# Patient Record
Sex: FEMALE | Race: WHITE | NOT HISPANIC OR LATINO | URBAN - METROPOLITAN AREA
[De-identification: names, ages, dates, MRNs, and addresses within clinical notes are randomized per-mention and may not be internally consistent; named-entity substitution may affect disease eponyms.]

---

## 2017-09-04 ENCOUNTER — EMERGENCY (EMERGENCY)
Facility: HOSPITAL | Age: 32
LOS: 0 days | Discharge: HOME | End: 2017-09-04

## 2017-09-04 DIAGNOSIS — R10.12 LEFT UPPER QUADRANT PAIN: ICD-10-CM

## 2017-09-04 DIAGNOSIS — R10.33 PERIUMBILICAL PAIN: ICD-10-CM

## 2021-06-14 ENCOUNTER — OUTPATIENT (OUTPATIENT)
Dept: OUTPATIENT SERVICES | Facility: HOSPITAL | Age: 36
LOS: 1 days | Discharge: HOME | End: 2021-06-14

## 2021-06-14 DIAGNOSIS — N97.9 FEMALE INFERTILITY, UNSPECIFIED: ICD-10-CM

## 2021-07-06 ENCOUNTER — EMERGENCY (EMERGENCY)
Facility: HOSPITAL | Age: 36
LOS: 0 days | Discharge: HOME | End: 2021-07-06
Attending: EMERGENCY MEDICINE | Admitting: EMERGENCY MEDICINE
Payer: COMMERCIAL

## 2021-07-06 VITALS
RESPIRATION RATE: 18 BRPM | DIASTOLIC BLOOD PRESSURE: 91 MMHG | SYSTOLIC BLOOD PRESSURE: 143 MMHG | WEIGHT: 179.9 LBS | OXYGEN SATURATION: 99 % | HEART RATE: 84 BPM | HEIGHT: 64 IN | TEMPERATURE: 97 F

## 2021-07-06 DIAGNOSIS — R30.0 DYSURIA: ICD-10-CM

## 2021-07-06 DIAGNOSIS — N39.0 URINARY TRACT INFECTION, SITE NOT SPECIFIED: ICD-10-CM

## 2021-07-06 LAB
APPEARANCE UR: CLEAR — SIGNIFICANT CHANGE UP
BACTERIA # UR AUTO: ABNORMAL
BILIRUB UR-MCNC: NEGATIVE — SIGNIFICANT CHANGE UP
COLOR SPEC: SIGNIFICANT CHANGE UP
DIFF PNL FLD: NEGATIVE — SIGNIFICANT CHANGE UP
EPI CELLS # UR: 0 /HPF — SIGNIFICANT CHANGE UP (ref 0–5)
GLUCOSE UR QL: NEGATIVE — SIGNIFICANT CHANGE UP
HYALINE CASTS # UR AUTO: 1 /LPF — SIGNIFICANT CHANGE UP (ref 0–7)
KETONES UR-MCNC: NEGATIVE — SIGNIFICANT CHANGE UP
LEUKOCYTE ESTERASE UR-ACNC: ABNORMAL
NITRITE UR-MCNC: NEGATIVE — SIGNIFICANT CHANGE UP
PH UR: 7.5 — SIGNIFICANT CHANGE UP (ref 5–8)
PROT UR-MCNC: SIGNIFICANT CHANGE UP
RBC CASTS # UR COMP ASSIST: 11 /HPF — HIGH (ref 0–4)
SP GR SPEC: 1.01 — SIGNIFICANT CHANGE UP (ref 1.01–1.03)
UROBILINOGEN FLD QL: SIGNIFICANT CHANGE UP
WBC UR QL: 35 /HPF — HIGH (ref 0–5)

## 2021-07-06 PROCEDURE — 99283 EMERGENCY DEPT VISIT LOW MDM: CPT

## 2021-07-06 RX ORDER — NITROFURANTOIN MACROCRYSTAL 50 MG
100 CAPSULE ORAL ONCE
Refills: 0 | Status: COMPLETED | OUTPATIENT
Start: 2021-07-06 | End: 2021-07-06

## 2021-07-06 RX ORDER — NITROFURANTOIN MACROCRYSTAL 50 MG
1 CAPSULE ORAL
Qty: 14 | Refills: 0
Start: 2021-07-06 | End: 2021-07-12

## 2021-07-06 RX ADMIN — Medication 100 MILLIGRAM(S): at 03:20

## 2021-07-06 NOTE — ED PROVIDER NOTE - PATIENT PORTAL LINK FT
You can access the FollowMyHealth Patient Portal offered by [facility] by registering at the following website: [email or web address]. By joining [facility] [employer] [facility]’s FollowMyHealth portal, you will also be able to view your health information using other applications (apps) compatible with our system.

## 2021-07-06 NOTE — ED PROVIDER NOTE - NS ED ROS FT
Constitutional: No fever, chills, unintended weight loss.  Eyes:  No visual changes, eye pain or discharge.  ENMT:  No hearing changes, pain, no sore throat or runny nose, no difficulty swallowing  Cardiac:  No chest pain, SOB or edema. No chest pain with exertion.  Respiratory:  No cough or respiratory distress. No hemoptysis. No history of asthma or RAD.  GI:  No nausea, vomiting, diarrhea or abdominal pain.  :  see hpi  MS:  No myalgia, muscle weakness, joint pain or back pain.  Neuro:  No headache or weakness.  No LOC.  Skin:  No skin rash.   Endocrine: No history of thyroid disease or diabetes.

## 2021-07-06 NOTE — ED PROVIDER NOTE - CARE PROVIDER_API CALL
ABHI LIU  [id number]  [address] [place] [address]  Phone: [phone]  Fax: [phone]  Established Patient  Follow Up Time: 7-10 Days

## 2021-07-06 NOTE — ED PROVIDER NOTE - OBJECTIVE STATEMENT
36F p/w dysuria x 2d. S/p uti ~1 month prior, finished course of abx unsure of name. No f/c, uri sx, cp/sob, nvd, abd pain, flank pain, abnorm vag discharge. Denies h/o STIs.

## 2021-07-06 NOTE — ED PROVIDER NOTE - PHYSICAL EXAMINATION
PE:  nad  skin warm, dry  ncat  neck supple  tachy 100s reg rhythm nl s1s2 no mrg  ctab no wrr  abd soft nd mild SP ttp rest non-tender no palpable masses no rgr  back non-tender no cvat  ext no cce dpi  neuro aaox3 grossly nf exam

## 2021-07-07 LAB
CULTURE RESULTS: SIGNIFICANT CHANGE UP
SPECIMEN SOURCE: SIGNIFICANT CHANGE UP

## 2021-08-04 NOTE — ED ADULT TRIAGE NOTE - SOURCE OF INFORMATION
Discharge and education instructions reviewed. Patient verbalized understanding, teach-back successful. Patient denied questions at this time. No acute distress noted. Patient instructed to follow-up as noted - return to emergency department if symptoms worsen. Patient verbalized understanding. Discharged per EDMD with discharge instructions.         [facility]  08/04/21 0291 Patient

## 2021-08-30 ENCOUNTER — OUTPATIENT (OUTPATIENT)
Dept: OUTPATIENT SERVICES | Facility: HOSPITAL | Age: 36
LOS: 1 days | Discharge: HOME | End: 2021-08-30

## 2021-08-30 DIAGNOSIS — N97.9 FEMALE INFERTILITY, UNSPECIFIED: ICD-10-CM

## 2021-10-05 NOTE — ED PROVIDER NOTE - NSDCPRINTRESULTS_ED_ALL_ED
Patient requests all Lab, Cardiology, and Radiology Results on their Discharge Instructions Private car

## 2024-09-16 ENCOUNTER — EMERGENCY (EMERGENCY)
Facility: HOSPITAL | Age: 39
LOS: 0 days | Discharge: HOME | End: 2024-09-16
Attending: STUDENT IN AN ORGANIZED HEALTH CARE EDUCATION/TRAINING PROGRAM
Payer: COMMERCIAL

## 2024-09-16 VITALS
HEIGHT: 64 IN | TEMPERATURE: 98 F | HEART RATE: 97 BPM | RESPIRATION RATE: 18 BRPM | OXYGEN SATURATION: 100 % | SYSTOLIC BLOOD PRESSURE: 144 MMHG | DIASTOLIC BLOOD PRESSURE: 97 MMHG | WEIGHT: 179.9 LBS

## 2024-09-16 DIAGNOSIS — R10.9 UNSPECIFIED ABDOMINAL PAIN: ICD-10-CM

## 2024-09-16 DIAGNOSIS — K21.9 GASTRO-ESOPHAGEAL REFLUX DISEASE WITHOUT ESOPHAGITIS: ICD-10-CM

## 2024-09-16 DIAGNOSIS — K80.20 CALCULUS OF GALLBLADDER WITHOUT CHOLECYSTITIS WITHOUT OBSTRUCTION: ICD-10-CM

## 2024-09-16 LAB
ALBUMIN SERPL ELPH-MCNC: 4.9 G/DL — SIGNIFICANT CHANGE UP (ref 3.5–5.2)
ALP SERPL-CCNC: 49 U/L — SIGNIFICANT CHANGE UP (ref 30–115)
ALT FLD-CCNC: 18 U/L — SIGNIFICANT CHANGE UP (ref 0–41)
ANION GAP SERPL CALC-SCNC: 11 MMOL/L — SIGNIFICANT CHANGE UP (ref 7–14)
AST SERPL-CCNC: 23 U/L — SIGNIFICANT CHANGE UP (ref 0–41)
BASOPHILS # BLD AUTO: 0.06 K/UL — SIGNIFICANT CHANGE UP (ref 0–0.2)
BASOPHILS NFR BLD AUTO: 0.7 % — SIGNIFICANT CHANGE UP (ref 0–1)
BILIRUB SERPL-MCNC: 0.3 MG/DL — SIGNIFICANT CHANGE UP (ref 0.2–1.2)
BUN SERPL-MCNC: 16 MG/DL — SIGNIFICANT CHANGE UP (ref 10–20)
CALCIUM SERPL-MCNC: 10 MG/DL — SIGNIFICANT CHANGE UP (ref 8.4–10.5)
CHLORIDE SERPL-SCNC: 101 MMOL/L — SIGNIFICANT CHANGE UP (ref 98–110)
CO2 SERPL-SCNC: 26 MMOL/L — SIGNIFICANT CHANGE UP (ref 17–32)
CREAT SERPL-MCNC: 0.6 MG/DL — LOW (ref 0.7–1.5)
EGFR: 118 ML/MIN/1.73M2 — SIGNIFICANT CHANGE UP
EOSINOPHIL # BLD AUTO: 0.1 K/UL — SIGNIFICANT CHANGE UP (ref 0–0.7)
EOSINOPHIL NFR BLD AUTO: 1.2 % — SIGNIFICANT CHANGE UP (ref 0–8)
GLUCOSE SERPL-MCNC: 90 MG/DL — SIGNIFICANT CHANGE UP (ref 70–99)
HCG SERPL QL: NEGATIVE — SIGNIFICANT CHANGE UP
HCT VFR BLD CALC: 42 % — SIGNIFICANT CHANGE UP (ref 37–47)
HGB BLD-MCNC: 13.6 G/DL — SIGNIFICANT CHANGE UP (ref 12–16)
IMM GRANULOCYTES NFR BLD AUTO: 0.2 % — SIGNIFICANT CHANGE UP (ref 0.1–0.3)
LACTATE SERPL-SCNC: 0.6 MMOL/L — LOW (ref 0.7–2)
LIDOCAIN IGE QN: 33 U/L — SIGNIFICANT CHANGE UP (ref 7–60)
LYMPHOCYTES # BLD AUTO: 1.9 K/UL — SIGNIFICANT CHANGE UP (ref 1.2–3.4)
LYMPHOCYTES # BLD AUTO: 23.5 % — SIGNIFICANT CHANGE UP (ref 20.5–51.1)
MCHC RBC-ENTMCNC: 28.8 PG — SIGNIFICANT CHANGE UP (ref 27–31)
MCHC RBC-ENTMCNC: 32.4 G/DL — SIGNIFICANT CHANGE UP (ref 32–37)
MCV RBC AUTO: 89 FL — SIGNIFICANT CHANGE UP (ref 81–99)
MONOCYTES # BLD AUTO: 0.5 K/UL — SIGNIFICANT CHANGE UP (ref 0.1–0.6)
MONOCYTES NFR BLD AUTO: 6.2 % — SIGNIFICANT CHANGE UP (ref 1.7–9.3)
NEUTROPHILS # BLD AUTO: 5.51 K/UL — SIGNIFICANT CHANGE UP (ref 1.4–6.5)
NEUTROPHILS NFR BLD AUTO: 68.2 % — SIGNIFICANT CHANGE UP (ref 42.2–75.2)
NRBC # BLD: 0 /100 WBCS — SIGNIFICANT CHANGE UP (ref 0–0)
PLATELET # BLD AUTO: 332 K/UL — SIGNIFICANT CHANGE UP (ref 130–400)
PMV BLD: 10.9 FL — HIGH (ref 7.4–10.4)
POTASSIUM SERPL-MCNC: 4.2 MMOL/L — SIGNIFICANT CHANGE UP (ref 3.5–5)
POTASSIUM SERPL-SCNC: 4.2 MMOL/L — SIGNIFICANT CHANGE UP (ref 3.5–5)
PROT SERPL-MCNC: 7 G/DL — SIGNIFICANT CHANGE UP (ref 6–8)
RBC # BLD: 4.72 M/UL — SIGNIFICANT CHANGE UP (ref 4.2–5.4)
RBC # FLD: 12.1 % — SIGNIFICANT CHANGE UP (ref 11.5–14.5)
SODIUM SERPL-SCNC: 138 MMOL/L — SIGNIFICANT CHANGE UP (ref 135–146)
WBC # BLD: 8.09 K/UL — SIGNIFICANT CHANGE UP (ref 4.8–10.8)
WBC # FLD AUTO: 8.09 K/UL — SIGNIFICANT CHANGE UP (ref 4.8–10.8)

## 2024-09-16 PROCEDURE — 76705 ECHO EXAM OF ABDOMEN: CPT | Mod: 26

## 2024-09-16 PROCEDURE — 80053 COMPREHEN METABOLIC PANEL: CPT

## 2024-09-16 PROCEDURE — 83690 ASSAY OF LIPASE: CPT

## 2024-09-16 PROCEDURE — 36415 COLL VENOUS BLD VENIPUNCTURE: CPT

## 2024-09-16 PROCEDURE — 84703 CHORIONIC GONADOTROPIN ASSAY: CPT

## 2024-09-16 PROCEDURE — 96374 THER/PROPH/DIAG INJ IV PUSH: CPT

## 2024-09-16 PROCEDURE — 76705 ECHO EXAM OF ABDOMEN: CPT

## 2024-09-16 PROCEDURE — 85025 COMPLETE CBC W/AUTO DIFF WBC: CPT

## 2024-09-16 PROCEDURE — 99284 EMERGENCY DEPT VISIT MOD MDM: CPT

## 2024-09-16 PROCEDURE — 99284 EMERGENCY DEPT VISIT MOD MDM: CPT | Mod: 25

## 2024-09-16 PROCEDURE — 83605 ASSAY OF LACTIC ACID: CPT

## 2024-09-16 RX ORDER — FAMOTIDINE 10 MG/ML
20 INJECTION INTRAVENOUS ONCE
Refills: 0 | Status: COMPLETED | OUTPATIENT
Start: 2024-09-16 | End: 2024-09-16

## 2024-09-16 RX ADMIN — FAMOTIDINE 20 MILLIGRAM(S): 10 INJECTION INTRAVENOUS at 11:54

## 2024-09-16 NOTE — ED PROVIDER NOTE - ATTENDING CONTRIBUTION TO CARE
39-year-old female only on Zoloft presenting here complaining of several months of right upper quadrant abdominal pain that worsened at night that are associated intermittent nausea currently pain is 2 out of 10 but worsened throughout the day has been trying to take omeprazole and Tums states had an endoscopy several months ago that was normal otherwise denies any unintentional weight loss no fevers or chills did endorse having a loose stool yesterday no urinary complaints  CONSTITUTIONAL: WA / WN / NAD  HEAD: NCAT  EYES: PERRL; EOMI;   ENT: Normal pharynx; mucous membranes pink/moist, no erythema.  NECK: Supple; no meningeal signs  CARD: RRR; nl S1/S2; no M/R/G.  RESP: Respiratory rate and effort are normal; breath sounds clear and equal bilaterally.  ABD: Soft, ND mild ruq ttp  MSK/EXT: No gross deformities; full range of motion.  SKIN: Warm and dry;   NEURO: AAOx3,  PSYCH: Memory Intact, Normal Affect

## 2024-09-16 NOTE — ED PROVIDER NOTE - NSFOLLOWUPINSTRUCTIONS_ED_ALL_ED_FT
Referral for   Our Emergency Department Referral Coordinators will be reaching out to you in the next 24-48 hours from 9:00am to 5:00pm (Monday to Friday) with a follow up appointment. Please expect a phone call from the hospital in that time frame. If you do not receive a call or if you have any questions or concerns, you can reach them at [phone]    Cholelithiasis  Body outline showing the digestive system with a close-up of the gallbladder with gallstones in it.   Cholelithiasis is a disease in which gallstones form in the gallbladder. The gallbladder is an organ that stores bile. Bile is a fluid that helps to digest fats. Gallstones begin as small crystals and can slowly grow into stones. They may cause no symptoms until they block the gallbladder duct, or cystic duct, when the gallbladder tightens, or contracts, after food is eaten. This can cause pain and is known as a gallbladder attack, or biliary colic.    There are two main types of gallstones:  Cholesterol stones. These are the most common type of gallstone. These stones are made of hardened cholesterol and are usually yellow-green in color.  Pigment stones. These are dark in color and are made of a red-yellow substance, called bilirubin,that forms when hemoglobin from red blood cells breaks down.  What are the causes?  This condition may be caused by too little or too much of the substances that are in bile. This can happen if the bile:  Has too much bilirubin. This can happen in certain blood diseases, such as sickle cell anemia.  Has too much cholesterol.  Does not have enough bile salts. These salts help the body absorb and digest fats.  It can also happen if the gallbladder is not emptying completely. This is common during pregnancy.    What increases the risk?  The following factors may make you more likely to develop this condition:  Being older than 40 years of age.  Eating a diet that is heavy in fried foods, fat, and refined carbohydrates, such as white bread and white rice.  Being female.  Having multiple pregnancies.  Using medicines that contain female hormones (estrogen) for a long time.  Having certain medical problems, such as:  Diabetes mellitus.  Obesity.  Cystic fibrosis.  Crohn's disease.  Cirrhosis or other long-term (chronic) liver disease.  Certain blood diseases, such as sickle cell anemia or leukemia.  Having a family history of gallstones.  Losing weight quickly.  What are the signs or symptoms?  In many cases, having gallstones causes no symptoms. These are called silent gallstones. If a gallstone blocks your bile duct, it can cause a gallbladder attack. The main symptom of a gallbladder attack is sudden pain in the upper right part of the abdomen. The pain:  Usually comes at night or after eating.  Can last for one hour or more.  Can spread to your right shoulder, back, or chest.  Can feel like indigestion. This is discomfort, burning, or fullness in your upper abdomen.  If the bile duct is blocked for more than a few hours, it can cause an infection or inflammation of your gallbladder (cholecystitis), liver, or pancreas. This can cause:  Nausea or vomiting.  Bloating.  Pain in your abdomen that lasts for 5 hours or longer.  Tenderness in your upper abdomen, often in the upper right section and under your rib cage.  Fever or chills.  Skin or the white parts of your eyes turning yellow (jaundice). This usually happens when a stone has blocked bile from passing through the bile duct.  Dark pee (urine) or light-colored poop (stools).  How is this diagnosed?  This condition may be diagnosed based on:  A physical exam.  Your medical history.  Ultrasound.  CT scan.  MRI.  You may also have other tests, including:  Blood tests to check for infection or inflammation.  The HIDA scan to see the gallbladder and the bile ducts.  An endoscope to check for blockage in the bile ducts.  How is this treated?  Treatment depends on the severity of your symptoms. Silent gallstones do not need treatment. You may need treatment if a blockage causes a gallbladder attack or other symptoms. Treatment may include:  If symptoms are mild, you may care for yourself at home. For mild symptoms:  Stop eating and drinking for 12–24 hours. You may drink water and clear liquids. This helps to "cool down" your gallbladder. After 1 or 2 days, eat a diet of simple or clear foods, such as broths and crackers.  Take medicines for pain or nausea.  Take antibiotics if you have an infection.  If symptoms are severe, you may:  Stay in the hospital for pain control or to treat severe infection.  Have surgery to remove the gallbladder (cholecystectomy). This is the most common treatment if all other treatments have not worked.  Take medicines to break up gallstones. Medicines may be used for up to 6–12 months.  Have an procedure to capture and remove gallstones.  Follow these instructions at home:  Medicines    Take over-the-counter and prescription medicines only as told by your health care provider.  If you were prescribed antibiotics, take them as told by your provider. Do not stop using the antibiotic even if you start to feel better.  Ask your provider if the medicine prescribed to you requires you to avoid driving or using machinery.  Eating and drinking    Drink enough fluid to keep your pee pale yellow. This is important during a gallbladder attack. Water and clear liquids are preferred.  Follow a healthy diet. This includes:  Reducing fatty foods, such as fried food and foods high in cholesterol.  Reducing refined carbohydrates, such as white bread and white rice.  Eating more fiber. Aim for foods such as almonds, fruit, and beans.  General instructions    Do not use any products that contain nicotine or tobacco. These products include cigarettes, chewing tobacco, and vaping devices, such as e-cigarettes. If you need help quitting, ask your provider.  Maintain a healthy weight.  Keep all follow-up visits. These may include seeing a specialist or a surgeon.  Where to find more information  National [place] of Diabetes and Digestive and Kidney Diseases: niddk.nih.gov  Contact a health care provider if:  You think you have had a gallbladder attack.  You have been diagnosed with silent gallstones and you develop indigestion or pain in your abdomen.  You have pain from a gallbladder attack that lasts for more than 2 hours.  You begin to have attacks more often.  You have nausea.  You have dark pee or light-colored poop.  Get help right away if:  You have pain in your abdomen that lasts for more than 5 hours or is getting worse.  You have a fever or chills.  You have vomiting that does not go away.  You develop jaundice.  This information is not intended to replace advice given to you by your health care provider. Make sure you discuss any questions you have with your health care provider. Referral for Surgery   Our Emergency Department Referral Coordinators will be reaching out to you in the next 24-48 hours from 9:00am to 5:00pm (Monday to Friday) with a follow up appointment. Please expect a phone call from the hospital in that time frame. If you do not receive a call or if you have any questions or concerns, you can reach them at [phone]    Cholelithiasis  Body outline showing the digestive system with a close-up of the gallbladder with gallstones in it.   Cholelithiasis is a disease in which gallstones form in the gallbladder. The gallbladder is an organ that stores bile. Bile is a fluid that helps to digest fats. Gallstones begin as small crystals and can slowly grow into stones. They may cause no symptoms until they block the gallbladder duct, or cystic duct, when the gallbladder tightens, or contracts, after food is eaten. This can cause pain and is known as a gallbladder attack, or biliary colic.    There are two main types of gallstones:  Cholesterol stones. These are the most common type of gallstone. These stones are made of hardened cholesterol and are usually yellow-green in color.  Pigment stones. These are dark in color and are made of a red-yellow substance, called bilirubin,that forms when hemoglobin from red blood cells breaks down.  What are the causes?  This condition may be caused by too little or too much of the substances that are in bile. This can happen if the bile:  Has too much bilirubin. This can happen in certain blood diseases, such as sickle cell anemia.  Has too much cholesterol.  Does not have enough bile salts. These salts help the body absorb and digest fats.  It can also happen if the gallbladder is not emptying completely. This is common during pregnancy.    What increases the risk?  The following factors may make you more likely to develop this condition:  Being older than 40 years of age.  Eating a diet that is heavy in fried foods, fat, and refined carbohydrates, such as white bread and white rice.  Being female.  Having multiple pregnancies.  Using medicines that contain female hormones (estrogen) for a long time.  Having certain medical problems, such as:  Diabetes mellitus.  Obesity.  Cystic fibrosis.  Crohn's disease.  Cirrhosis or other long-term (chronic) liver disease.  Certain blood diseases, such as sickle cell anemia or leukemia.  Having a family history of gallstones.  Losing weight quickly.  What are the signs or symptoms?  In many cases, having gallstones causes no symptoms. These are called silent gallstones. If a gallstone blocks your bile duct, it can cause a gallbladder attack. The main symptom of a gallbladder attack is sudden pain in the upper right part of the abdomen. The pain:  Usually comes at night or after eating.  Can last for one hour or more.  Can spread to your right shoulder, back, or chest.  Can feel like indigestion. This is discomfort, burning, or fullness in your upper abdomen.  If the bile duct is blocked for more than a few hours, it can cause an infection or inflammation of your gallbladder (cholecystitis), liver, or pancreas. This can cause:  Nausea or vomiting.  Bloating.  Pain in your abdomen that lasts for 5 hours or longer.  Tenderness in your upper abdomen, often in the upper right section and under your rib cage.  Fever or chills.  Skin or the white parts of your eyes turning yellow (jaundice). This usually happens when a stone has blocked bile from passing through the bile duct.  Dark pee (urine) or light-colored poop (stools).  How is this diagnosed?  This condition may be diagnosed based on:  A physical exam.  Your medical history.  Ultrasound.  CT scan.  MRI.  You may also have other tests, including:  Blood tests to check for infection or inflammation.  The HIDA scan to see the gallbladder and the bile ducts.  An endoscope to check for blockage in the bile ducts.  How is this treated?  Treatment depends on the severity of your symptoms. Silent gallstones do not need treatment. You may need treatment if a blockage causes a gallbladder attack or other symptoms. Treatment may include:  If symptoms are mild, you may care for yourself at home. For mild symptoms:  Stop eating and drinking for 12–24 hours. You may drink water and clear liquids. This helps to "cool down" your gallbladder. After 1 or 2 days, eat a diet of simple or clear foods, such as broths and crackers.  Take medicines for pain or nausea.  Take antibiotics if you have an infection.  If symptoms are severe, you may:  Stay in the hospital for pain control or to treat severe infection.  Have surgery to remove the gallbladder (cholecystectomy). This is the most common treatment if all other treatments have not worked.  Take medicines to break up gallstones. Medicines may be used for up to 6–12 months.  Have an procedure to capture and remove gallstones.  Follow these instructions at home:  Medicines    Take over-the-counter and prescription medicines only as told by your health care provider.  If you were prescribed antibiotics, take them as told by your provider. Do not stop using the antibiotic even if you start to feel better.  Ask your provider if the medicine prescribed to you requires you to avoid driving or using machinery.  Eating and drinking    Drink enough fluid to keep your pee pale yellow. This is important during a gallbladder attack. Water and clear liquids are preferred.  Follow a healthy diet. This includes:  Reducing fatty foods, such as fried food and foods high in cholesterol.  Reducing refined carbohydrates, such as white bread and white rice.  Eating more fiber. Aim for foods such as almonds, fruit, and beans.  General instructions    Do not use any products that contain nicotine or tobacco. These products include cigarettes, chewing tobacco, and vaping devices, such as e-cigarettes. If you need help quitting, ask your provider.  Maintain a healthy weight.  Keep all follow-up visits. These may include seeing a specialist or a surgeon.  Where to find more information  National [place] of Diabetes and Digestive and Kidney Diseases: niddk.nih.gov  Contact a health care provider if:  You think you have had a gallbladder attack.  You have been diagnosed with silent gallstones and you develop indigestion or pain in your abdomen.  You have pain from a gallbladder attack that lasts for more than 2 hours.  You begin to have attacks more often.  You have nausea.  You have dark pee or light-colored poop.  Get help right away if:  You have pain in your abdomen that lasts for more than 5 hours or is getting worse.  You have a fever or chills.  You have vomiting that does not go away.  You develop jaundice.  This information is not intended to replace advice given to you by your health care provider. Make sure you discuss any questions you have with your health care provider.

## 2024-09-16 NOTE — ED PROVIDER NOTE - OBJECTIVE STATEMENT
Penile pain 31-year-old female with past medical history of GERD presents to the ED complaining of abdominal pain for months that is worse in the past few days.  Pain is primarily in the epigastrium and right lower quadrant and radiates around right side to right upper back.  Exacerbated by food, particularly food with fat high content.  Pain at worst is 7 out of 10 but currently 3 out of 10.  Takes omeprazole and Tums for pain but no improvement.  Denies fever/chills/CP/SOB/vomiting/diarrhea/constipation/dysuria.

## 2024-09-16 NOTE — ED PROVIDER NOTE - PHYSICAL EXAMINATION
VITAL SIGNS: I have reviewed nursing notes and confirm.  CONSTITUTIONAL: Well-developed; well-nourished; in no acute distress.  SKIN: Skin exam is warm and dry, no acute rash.  CARD: S1, S2 normal; no murmurs, gallops, or rubs. Regular rate and rhythm.  RESP: Normal respiratory effort, no tachypnea or distress. Lungs CTAB, no wheezes, rales or rhonchi.  ABD: soft, Nondistended, tender in right upper quadrant and epigastrium.  EXT: Normal ROM. No clubbing, cyanosis or edema.  LYMPH: No acute cervical adenopathy.  NEURO: Alert, oriented. Grossly unremarkable. No focal deficits.  PSYCH: Cooperative, appropriate.

## 2024-09-16 NOTE — ED PROVIDER NOTE - PATIENT PORTAL LINK FT
You can access the FollowMyHealth Patient Portal offered by [facility] by registering at the following website: [email or web address]. By joining [facility]’s FollowMyHealth portal, you will also be able to view your health information using other applications (apps) compatible with our system.

## 2024-09-28 ENCOUNTER — INPATIENT (INPATIENT)
Facility: HOSPITAL | Age: 39
LOS: 1 days | Discharge: HOME | DRG: 410 | End: 2024-09-30
Attending: SPECIALIST | Admitting: SURGERY
Payer: COMMERCIAL

## 2024-09-28 VITALS
HEIGHT: 64 IN | HEART RATE: 82 BPM | DIASTOLIC BLOOD PRESSURE: 89 MMHG | TEMPERATURE: 98 F | WEIGHT: 164.91 LBS | SYSTOLIC BLOOD PRESSURE: 142 MMHG | OXYGEN SATURATION: 99 % | RESPIRATION RATE: 16 BRPM

## 2024-09-28 LAB
ALBUMIN SERPL ELPH-MCNC: 4.5 G/DL — SIGNIFICANT CHANGE UP (ref 3.5–5.2)
ALP SERPL-CCNC: 55 U/L — SIGNIFICANT CHANGE UP (ref 30–115)
ALT FLD-CCNC: 6 U/L — SIGNIFICANT CHANGE UP (ref 0–41)
ANION GAP SERPL CALC-SCNC: 9 MMOL/L — SIGNIFICANT CHANGE UP (ref 7–14)
APPEARANCE UR: CLEAR — SIGNIFICANT CHANGE UP
AST SERPL-CCNC: 14 U/L — SIGNIFICANT CHANGE UP (ref 0–41)
BACTERIA # UR AUTO: NEGATIVE /HPF — SIGNIFICANT CHANGE UP
BASOPHILS # BLD AUTO: 0.07 K/UL — SIGNIFICANT CHANGE UP (ref 0–0.2)
BASOPHILS NFR BLD AUTO: 0.8 % — SIGNIFICANT CHANGE UP (ref 0–1)
BILIRUB SERPL-MCNC: <0.2 MG/DL — SIGNIFICANT CHANGE UP (ref 0.2–1.2)
BILIRUB UR-MCNC: NEGATIVE — SIGNIFICANT CHANGE UP
BUN SERPL-MCNC: 15 MG/DL — SIGNIFICANT CHANGE UP (ref 10–20)
CALCIUM SERPL-MCNC: 10 MG/DL — SIGNIFICANT CHANGE UP (ref 8.4–10.5)
CAST: 0 /LPF — SIGNIFICANT CHANGE UP (ref 0–4)
CHLORIDE SERPL-SCNC: 106 MMOL/L — SIGNIFICANT CHANGE UP (ref 98–110)
CO2 SERPL-SCNC: 24 MMOL/L — SIGNIFICANT CHANGE UP (ref 17–32)
COLOR SPEC: YELLOW — SIGNIFICANT CHANGE UP
CREAT SERPL-MCNC: 0.7 MG/DL — SIGNIFICANT CHANGE UP (ref 0.7–1.5)
DIFF PNL FLD: ABNORMAL
EGFR: 113 ML/MIN/1.73M2 — SIGNIFICANT CHANGE UP
EOSINOPHIL # BLD AUTO: 0.18 K/UL — SIGNIFICANT CHANGE UP (ref 0–0.7)
EOSINOPHIL NFR BLD AUTO: 2.1 % — SIGNIFICANT CHANGE UP (ref 0–8)
GLUCOSE SERPL-MCNC: 90 MG/DL — SIGNIFICANT CHANGE UP (ref 70–99)
GLUCOSE UR QL: NEGATIVE MG/DL — SIGNIFICANT CHANGE UP
HCG SERPL QL: NEGATIVE — SIGNIFICANT CHANGE UP
HCT VFR BLD CALC: 41.2 % — SIGNIFICANT CHANGE UP (ref 37–47)
HGB BLD-MCNC: 13.6 G/DL — SIGNIFICANT CHANGE UP (ref 12–16)
IMM GRANULOCYTES NFR BLD AUTO: 0.2 % — SIGNIFICANT CHANGE UP (ref 0.1–0.3)
KETONES UR-MCNC: NEGATIVE MG/DL — SIGNIFICANT CHANGE UP
LEUKOCYTE ESTERASE UR-ACNC: NEGATIVE — SIGNIFICANT CHANGE UP
LIDOCAIN IGE QN: 43 U/L — SIGNIFICANT CHANGE UP (ref 7–60)
LYMPHOCYTES # BLD AUTO: 3.39 K/UL — SIGNIFICANT CHANGE UP (ref 1.2–3.4)
LYMPHOCYTES # BLD AUTO: 38.9 % — SIGNIFICANT CHANGE UP (ref 20.5–51.1)
MCHC RBC-ENTMCNC: 29.2 PG — SIGNIFICANT CHANGE UP (ref 27–31)
MCHC RBC-ENTMCNC: 33 G/DL — SIGNIFICANT CHANGE UP (ref 32–37)
MCV RBC AUTO: 88.6 FL — SIGNIFICANT CHANGE UP (ref 81–99)
MONOCYTES # BLD AUTO: 0.64 K/UL — HIGH (ref 0.1–0.6)
MONOCYTES NFR BLD AUTO: 7.3 % — SIGNIFICANT CHANGE UP (ref 1.7–9.3)
NEUTROPHILS # BLD AUTO: 4.41 K/UL — SIGNIFICANT CHANGE UP (ref 1.4–6.5)
NEUTROPHILS NFR BLD AUTO: 50.7 % — SIGNIFICANT CHANGE UP (ref 42.2–75.2)
NITRITE UR-MCNC: NEGATIVE — SIGNIFICANT CHANGE UP
NRBC # BLD: 0 /100 WBCS — SIGNIFICANT CHANGE UP (ref 0–0)
PH UR: 7 — SIGNIFICANT CHANGE UP (ref 5–8)
PLATELET # BLD AUTO: 336 K/UL — SIGNIFICANT CHANGE UP (ref 130–400)
PMV BLD: 11.3 FL — HIGH (ref 7.4–10.4)
POTASSIUM SERPL-MCNC: 4 MMOL/L — SIGNIFICANT CHANGE UP (ref 3.5–5)
POTASSIUM SERPL-SCNC: 4 MMOL/L — SIGNIFICANT CHANGE UP (ref 3.5–5)
PROT SERPL-MCNC: 7.1 G/DL — SIGNIFICANT CHANGE UP (ref 6–8)
PROT UR-MCNC: NEGATIVE MG/DL — SIGNIFICANT CHANGE UP
RBC # BLD: 4.65 M/UL — SIGNIFICANT CHANGE UP (ref 4.2–5.4)
RBC # FLD: 11.9 % — SIGNIFICANT CHANGE UP (ref 11.5–14.5)
RBC CASTS # UR COMP ASSIST: 25 /HPF — HIGH (ref 0–4)
SODIUM SERPL-SCNC: 139 MMOL/L — SIGNIFICANT CHANGE UP (ref 135–146)
SP GR SPEC: 1.01 — SIGNIFICANT CHANGE UP (ref 1–1.03)
SQUAMOUS # UR AUTO: 1 /HPF — SIGNIFICANT CHANGE UP (ref 0–5)
UROBILINOGEN FLD QL: 0.2 MG/DL — SIGNIFICANT CHANGE UP (ref 0.2–1)
WBC # BLD: 8.71 K/UL — SIGNIFICANT CHANGE UP (ref 4.8–10.8)
WBC # FLD AUTO: 8.71 K/UL — SIGNIFICANT CHANGE UP (ref 4.8–10.8)
WBC UR QL: 0 /HPF — SIGNIFICANT CHANGE UP (ref 0–5)

## 2024-09-28 PROCEDURE — 99285 EMERGENCY DEPT VISIT HI MDM: CPT

## 2024-09-28 RX ORDER — FAMOTIDINE 40 MG
20 TABLET ORAL ONCE
Refills: 0 | Status: COMPLETED | OUTPATIENT
Start: 2024-09-28 | End: 2024-09-28

## 2024-09-28 RX ORDER — ONDANSETRON HCL/PF 4 MG/2 ML
4 VIAL (ML) INJECTION ONCE
Refills: 0 | Status: COMPLETED | OUTPATIENT
Start: 2024-09-28 | End: 2024-09-28

## 2024-09-28 RX ORDER — SODIUM CHLORIDE 0.9 % (FLUSH) 0.9 %
1000 SYRINGE (ML) INJECTION ONCE
Refills: 0 | Status: COMPLETED | OUTPATIENT
Start: 2024-09-28 | End: 2024-09-28

## 2024-09-28 RX ORDER — MORPHINE SULFATE 30 MG/1
4 TABLET, FILM COATED, EXTENDED RELEASE ORAL ONCE
Refills: 0 | Status: DISCONTINUED | OUTPATIENT
Start: 2024-09-28 | End: 2024-09-28

## 2024-09-28 RX ADMIN — MORPHINE SULFATE 4 MILLIGRAM(S): 30 TABLET, FILM COATED, EXTENDED RELEASE ORAL at 22:07

## 2024-09-28 RX ADMIN — Medication 20 MILLIGRAM(S): at 22:07

## 2024-09-28 RX ADMIN — Medication 1000 MILLILITER(S): at 22:07

## 2024-09-28 RX ADMIN — Medication 4 MILLIGRAM(S): at 22:07

## 2024-09-28 NOTE — ED ADULT TRIAGE NOTE - CHIEF COMPLAINT QUOTE
pt dx with gallstones 2wk ago. Has outpatient surgery scheduled for 10/11. Presents today for increasing abd pain/lethargy/decreased appetite

## 2024-09-28 NOTE — ED PROVIDER NOTE - DIFFERENTIAL DIAGNOSIS
Electrolyte abnormalities, dehydration, DEREK, hyperglycemia, hypoglycemia, symptomatic anemia.  r/o cholecystitis. Differential Diagnosis

## 2024-09-28 NOTE — ED ADULT TRIAGE NOTE - HEIGHT IN FEET
MDO to SW for EDPA. Per chart review, pt was assessed and resources were provided on 3/7/23. Pt RN states that pt is appropriate w/infant, no other resources are needed at this time. SW/CM to remain available for support and/or discharge planning.      FABIANA Rothman, [facility]  Social Work   AJP:#[id number]
WENDI self referral due to [address]. SW met with patient at bedside. SW confirmed face sheet contact as correct. Baby girl name:Marcial  Date of delivery:3/6/2023  Time of delivery: 10:10 PM  Delivery method:Normal spontaneous vaginal delivery  Siblings age: N/a. Patient employed: Yes. Length of maternity leave:2 months. Father of baby employed:Yes. Length of paternity leave:TBD. Breast or formula feed:Both. Pediatrician:YASMIN. WENDI encouraged pt to schedule infant first appointment (usually within 48 hours of discharge) prior to pt discharge. Pt expressed understanding. Infant Insurance:Medicaid. Change HC contacted:Yes. Mental Health History: Denied. Medications:Denied. Therapist:Denied. Psychiatrist:Denied. SW intern discussed signs, symptoms and risks associated with post partum depression & anxiety. SW intern provided pt with PMAD resources. Other resources provided:[facility], [address] (Edita Ganser), [employer], [employer] and [employer], and [address] [facility]     Patient support system:Pt's mother. Patient denied current questions/needs from WENDI.    10:56AM: SW intern called [address] bu went to voicemail and SW intern left a voicemail to put the pt and pt's infant on Medicaid. WENDI/FLOYD to remain available for support and/or discharge planning.        [facility] Work Intern
5

## 2024-09-28 NOTE — ED PROVIDER NOTE - OBJECTIVE STATEMENT
39 yold female to Ed Pmhx gall stones pending surgery 10/11 by Dr. Fitzgerald here; pt now presents to Ed c/o abdominal pain RUQ sharp constant with nausea no vomting; pt denies fever, chills, sob, cough; pt unable to tolerate po; denies alcohol use;

## 2024-09-28 NOTE — ED ADULT TRIAGE NOTE - BIRTH SEX
Health Maintenance Due   Topic Date Due    Diabetic foot exam  Never done    Diabetic retinal exam  Never done   [employer] Visit (AWV)  03/11/2021    TSH testing  08/17/2021 Female

## 2024-09-28 NOTE — ED PROVIDER NOTE - PHYSICAL EXAMINATION
Constitutional: Well developed, well nourished. NAD  Head: Normocephalic, atraumatic.  Eyes: PERRL, EOMI.  ENT: No nasal discharge. Mucous membranes dry.  Neck: Supple. Painless ROM.  Cardiovascular:  . Regular rate and rhythm.    Pulmonary:  Lungs clear to auscultation bilaterally   Abdominal: Soft BS +; tenderness ruq no rebound, guarding or flank pain;   Extremities. Pelvis stable. No lower extremity edema, symmetric calves.  Skin: No rashes, cyanosis.  Neuro: AAOx3. No focal neurological deficits.  Psych: Normal mood. Normal affect.

## 2024-09-28 NOTE — ED PROVIDER NOTE - ATTENDING APP SHARED VISIT CONTRIBUTION OF CARE
Patient is c/o abd pain, RUQ abd pain, h/o gall stones. Denies f/c/cp/sob.   Vitals reviewed.   Patient is awake, alert, answering questions appropriately, appears comfortable and not in any distress.  Lungs: CTA, no wheezing, no crackles.  Abd: +BS, +RUQ tenderness, ND, soft,   A/P: Abd pain,   Labs, US,   reevaluation.

## 2024-09-28 NOTE — ED ADULT NURSE NOTE - OBJECTIVE STATEMENT
a/o x 3 c/o RUQ pain radiating to mid abdomen. Pt also endorses nausea. Pt states that she was told she had gallstones and is scheduled for surgery on 10/11 but the pain has been worsening. PMHX depression

## 2024-09-28 NOTE — ED ADULT NURSE NOTE - NSFALLUNIVINTERV_ED_ALL_ED
Bed/Stretcher in lowest position, wheels locked, appropriate side rails in place/Call bell, personal items and telephone in reach/Instruct patient to call for assistance before getting out of bed/chair/stretcher/Non-slip footwear applied when patient is off stretcher/[place] to call system/Physically safe environment - no spills, clutter or unnecessary equipment/Purposeful proactive rounding/Room/bathroom lighting operational, light cord in reach

## 2024-09-29 VITALS
RESPIRATION RATE: 18 BRPM | DIASTOLIC BLOOD PRESSURE: 65 MMHG | HEART RATE: 98 BPM | OXYGEN SATURATION: 98 % | SYSTOLIC BLOOD PRESSURE: 107 MMHG

## 2024-09-29 DIAGNOSIS — K80.20 CALCULUS OF GALLBLADDER WITHOUT CHOLECYSTITIS WITHOUT OBSTRUCTION: ICD-10-CM

## 2024-09-29 LAB
APTT BLD: 30.3 SEC — SIGNIFICANT CHANGE UP (ref 27–39.2)
BLD GP AB SCN SERPL QL: SIGNIFICANT CHANGE UP
INR BLD: 1.04 RATIO — SIGNIFICANT CHANGE UP (ref 0.65–1.3)
PROTHROM AB SERPL-ACNC: 11.9 SEC — SIGNIFICANT CHANGE UP (ref 9.95–12.87)

## 2024-09-29 PROCEDURE — 36415 COLL VENOUS BLD VENIPUNCTURE: CPT

## 2024-09-29 PROCEDURE — C9399: CPT

## 2024-09-29 PROCEDURE — 85730 THROMBOPLASTIN TIME PARTIAL: CPT

## 2024-09-29 PROCEDURE — 88304 TISSUE EXAM BY PATHOLOGIST: CPT | Mod: 26

## 2024-09-29 PROCEDURE — 88304 TISSUE EXAM BY PATHOLOGIST: CPT

## 2024-09-29 PROCEDURE — 76705 ECHO EXAM OF ABDOMEN: CPT | Mod: 26

## 2024-09-29 PROCEDURE — C1889: CPT

## 2024-09-29 PROCEDURE — 85610 PROTHROMBIN TIME: CPT

## 2024-09-29 PROCEDURE — 86901 BLOOD TYPING SEROLOGIC RH(D): CPT

## 2024-09-29 PROCEDURE — 86900 BLOOD TYPING SEROLOGIC ABO: CPT

## 2024-09-29 PROCEDURE — 86850 RBC ANTIBODY SCREEN: CPT

## 2024-09-29 RX ORDER — ONDANSETRON HCL/PF 4 MG/2 ML
4 VIAL (ML) INJECTION EVERY 6 HOURS
Refills: 0 | Status: DISCONTINUED | OUTPATIENT
Start: 2024-09-29 | End: 2024-09-30

## 2024-09-29 RX ORDER — METOCLOPRAMIDE HCL 5 MG
10 TABLET ORAL ONCE
Refills: 0 | Status: DISCONTINUED | OUTPATIENT
Start: 2024-09-29 | End: 2024-09-29

## 2024-09-29 RX ORDER — HYDROMORPHONE HYDROCHLORIDE 1 MG/ML
0.5 INJECTION, SOLUTION INTRAMUSCULAR; INTRAVENOUS; SUBCUTANEOUS
Refills: 0 | Status: DISCONTINUED | OUTPATIENT
Start: 2024-09-29 | End: 2024-09-30

## 2024-09-29 RX ORDER — SODIUM CHLORIDE IRRIG SOLUTION 0.9 %
1000 SOLUTION, IRRIGATION IRRIGATION
Refills: 0 | Status: DISCONTINUED | OUTPATIENT
Start: 2024-09-29 | End: 2024-09-30

## 2024-09-29 RX ORDER — ACETAMINOPHEN 325 MG
1000 TABLET ORAL ONCE
Refills: 0 | Status: DISCONTINUED | OUTPATIENT
Start: 2024-09-29 | End: 2024-09-30

## 2024-09-29 RX ORDER — SERTRALINE HYDROCHLORIDE 100 MG/1
1 TABLET, FILM COATED ORAL
Refills: 0 | DISCHARGE

## 2024-09-29 RX ORDER — ACETAMINOPHEN AND CODEINE PHOSPHATE 30; 300 MG/1; MG/1
1 TABLET ORAL
Qty: 20 | Refills: 0
Start: 2024-09-29 | End: 2024-10-03

## 2024-09-29 RX ORDER — METOCLOPRAMIDE HCL 5 MG
10 TABLET ORAL ONCE
Refills: 0 | Status: COMPLETED | OUTPATIENT
Start: 2024-09-29 | End: 2024-09-29

## 2024-09-29 RX ORDER — ACETAMINOPHEN 325 MG
650 TABLET ORAL EVERY 6 HOURS
Refills: 0 | Status: DISCONTINUED | OUTPATIENT
Start: 2024-09-29 | End: 2024-09-30

## 2024-09-29 RX ORDER — KETOROLAC TROMETHAMINE 10 MG/1
15 TABLET, FILM COATED ORAL EVERY 6 HOURS
Refills: 0 | Status: DISCONTINUED | OUTPATIENT
Start: 2024-09-29 | End: 2024-09-30

## 2024-09-29 RX ORDER — SERTRALINE HYDROCHLORIDE 100 MG/1
25 TABLET, FILM COATED ORAL AT BEDTIME
Refills: 0 | Status: DISCONTINUED | OUTPATIENT
Start: 2024-09-29 | End: 2024-09-30

## 2024-09-29 RX ORDER — APREPITANT 125MG-80MG
40 KIT ORAL ONCE
Refills: 0 | Status: COMPLETED | OUTPATIENT
Start: 2024-09-29 | End: 2024-09-29

## 2024-09-29 RX ORDER — ENOXAPARIN SODIUM 150 MG/ML
40 INJECTION SUBCUTANEOUS EVERY 24 HOURS
Refills: 0 | Status: DISCONTINUED | OUTPATIENT
Start: 2024-09-29 | End: 2024-09-30

## 2024-09-29 RX ADMIN — MORPHINE SULFATE 4 MILLIGRAM(S): 30 TABLET, FILM COATED, EXTENDED RELEASE ORAL at 15:13

## 2024-09-29 RX ADMIN — HYDROMORPHONE HYDROCHLORIDE 0.5 MILLIGRAM(S): 1 INJECTION, SOLUTION INTRAMUSCULAR; INTRAVENOUS; SUBCUTANEOUS at 19:04

## 2024-09-29 RX ADMIN — Medication 115 MILLILITER(S): at 01:27

## 2024-09-29 RX ADMIN — Medication 4 MILLIGRAM(S): at 20:11

## 2024-09-29 RX ADMIN — Medication 10 MILLIGRAM(S): at 20:45

## 2024-09-29 RX ADMIN — HYDROMORPHONE HYDROCHLORIDE 0.5 MILLIGRAM(S): 1 INJECTION, SOLUTION INTRAMUSCULAR; INTRAVENOUS; SUBCUTANEOUS at 18:50

## 2024-09-29 NOTE — H&P ADULT - HISTORY OF PRESENT ILLNESS
Patient: ELIANA MANCERA , 39y ([birth year])Female   MRN: [id number]  Location: [facility] ED  Visit: 09-28-24 Emergency  Date: 09-29-24 @ 00:54    HPI: 39 year old female with PMH of GERD and cholelithiasis, presents with abdominal pain. Patient reports that she has been experiencing RUQ band like abdominal pain for about a year now, but has worsened over the past month. Patient has a scheduled cholecystectomy with Dr. Fitzgerald on 10/11. Patient has changed her diet to consist of non-fatty meals, but abdominal pain occurrs after any PO intake. Patient experienced an episode of nausea without emesis today. Patient denies any fevers, chills, dizziness, HA, SOB, bowel or urinary changes. Patient had an endoscopy this year, with unremarkable findings. At bedside examination, patient's abdomen is soft, nondistended, nontender.

## 2024-09-29 NOTE — H&P ADULT - ATTENDING COMMENTS
ACS Attending Note Attestation    Patient is examined and evaluated at the bedside with the residents/PAs. Treatment plan discussed with the team, nurses, and consulting physicians and consulting teams. Medications, radiological studies and all other relevant studies reviewed. I reviewed the resident/PA note and agreed with above assessment and plan with following additions and corrections.    ELIANA MANCERA Patient is a 39y old  Female who presents with a chief complaint of   Physical Exam:  Radiological studies reviewed by me with following noted:  Allergies    Allergy Status Unknown    Intolerances      PAST MEDICAL & SURGICAL HISTORY:  Gall stones      GERD (gastroesophageal reflux disease)      No significant past surgical history        Vital Signs Last 24 Hrs  T(C): 36.7 (28 Sep 2024 23:49), Max: 36.7 (28 Sep 2024 20:54)  T(F): 98.1 (28 Sep 2024 23:49), Max: 98.1 (28 Sep 2024 20:54)  HR: 64 (28 Sep 2024 23:49) (64 - 82)  BP: 129/84 (28 Sep 2024 23:49) (129/84 - 142/89)  BP(mean): --  RR: 18 (28 Sep 2024 23:49) (16 - 18)  SpO2: 99% (28 Sep 2024 23:49) (99% - 99%)    Parameters below as of 28 Sep 2024 23:49  Patient On (Oxygen Delivery Method): room air                            13.6   8.71  )-----------( 336      ( 28 Sep 2024 22:08 )             41.2     09-28    139  |  106  |  15  ----------------------------<  90  4.0   |  24  |  0.7    Ca    10.0      28 Sep 2024 22:08    TPro  7.1  /  Alb  4.5  /  TBili  <0.2  /  DBili  x   /  AST  14  /  ALT  6   /  AlkPhos  55  09-28      39yF w/ PMHx of  GERD and cholelithiasis  who presented with RUQ abdominal pain. Physical exam findings, imaging, and labs as documented above.     PLAN:  - Admit to surgery 4C   - NPO, IVF   - Pain control   - Nausea control   - Hemodynamic monitoring   - DVT ppx       Eileen Bear MD, FACS  Trauma/ACS/Surcical Critical care Attending

## 2024-09-29 NOTE — DISCHARGE NOTE PROVIDER - CARE PROVIDER_API CALL
[facility]  Surgery  [address] [place] [id number]  Phone: [phone]  Fax: [phone]  Established Patient  Follow Up Time: 2 weeks

## 2024-09-29 NOTE — H&P ADULT - ASSESSMENT
ASSESSMENT:  39yF w/ PMHx of  GERD and cholelithiasis  who presented with RUQ abdominal pain. Physical exam findings, imaging, and labs as documented above.     PLAN:  - Admit to surgery 4C   - NPO, IVF   - Pain control   - Nausea control   - Hemodynamic monitoring   - DVT ppx       Above plan discussed with Attending Surgeon Dr. Arellano  , patient, patient family, and Primary team  09-29-24 @ 00:54

## 2024-09-29 NOTE — DISCHARGE NOTE PROVIDER - HOSPITAL COURSE
10/29  39 year old female with PMH of GERD and cholelithiasis, presents with abdominal pain. Patient reports that she has been experiencing RUQ band like abdominal pain for about a year now, but has worsened over the past month. Patient has a scheduled cholecystectomy with Dr. Fitzgerald on 10/11. Patient has changed her diet to consist of non-fatty meals, but abdominal pain occurrs after any PO intake. Patient experienced an episode of nausea without emesis today. Patient denies any fevers, chills, dizziness, HA, SOB, bowel or urinary changes. Patient had an endoscopy this year, with unremarkable findings. At bedside examination, patient's abdomen is soft, nondistended, nontender.  Patient admitted to surgery service and underwent laparoscopic cholecystectomy the same day.  Post operatively patient recovered well, tolerated diet, pain controlled, AVSS, and was discharged home with scheduled followup.

## 2024-09-29 NOTE — DISCHARGE NOTE PROVIDER - NSDCFUSCHEDAPPT_GEN_ALL_CORE_FT
Maxwell Fitzgerald  [facility] PreAdmits  Scheduled Appointment: 10/01/2024    [facility] PreAdmits  Scheduled Appointment: 10/11/2024

## 2024-09-29 NOTE — H&P ADULT - NSHPLABSRESULTS_GEN_ALL_CORE
LAB/STUDIES:                        13.6   8.71  )-----------( 336      ( 28 Sep 2024 22:08 )             41.2         139  |  106  |  15  ----------------------------<  90  4.0   |  24  |  0.7    Ca    10.0      28 Sep 2024 22:08    TPro  7.1  /  Alb  4.5  /  TBili  <0.2  /  DBili  x   /  AST  14  /  ALT  6   /  AlkPhos  55        LIVER FUNCTIONS - ( 28 Sep 2024 22:08 )  Alb: 4.5 g/dL / Pro: 7.1 g/dL / ALK PHOS: 55 U/L / ALT: 6 U/L / AST: 14 U/L / GGT: x           Urinalysis Basic - ( 28 Sep 2024 22:50 )    Color: Yellow / Appearance: Clear / S.013 / pH: x  Gluc: x / Ketone: Negative mg/dL  / Bili: Negative / Urobili: 0.2 mg/dL   Blood: x / Protein: Negative mg/dL / Nitrite: Negative   Leuk Esterase: Negative / RBC: 25 /HPF / WBC 0 /HPF   Sq Epi: x / Non Sq Epi: 1 /HPF / Bacteria: Negative /HPF        IMAGING:  < from: US Abdomen Upper Quadrant Right (24 @ 00:08) >  Cholelithiasis without definite sonographic evidence of acute   cholecystitis.

## 2024-09-29 NOTE — DISCHARGE NOTE PROVIDER - NSDCCPCAREPLAN_GEN_ALL_CORE_FT
PRINCIPAL DISCHARGE DIAGNOSIS  Diagnosis: Cholelithiasis  Assessment and Plan of Treatment:       SECONDARY DISCHARGE DIAGNOSES  Diagnosis: Biliary colic  Assessment and Plan of Treatment:

## 2024-09-29 NOTE — DISCHARGE NOTE PROVIDER - NSDCFUADDINST_GEN_ALL_CORE_FT
Diet : You may resume your regular diet. It is recommended to eat a low fat diet and increase fluid intake to avoid constipation.  Diarrhea post op is not unexpected. If you are constipated, take stool softeners if needed to avoid straining with stool.    Pain : Take Tylenol #3 as needed for pain every 6 hours. Unless otherwise specified prior or by a physician, you may alternate with Motrin every 8 hours. Do not exceed more than 4000mg of Tylenol (acetaminophen) per day. Do not take Motrin if you are allergic to NSAIDs.    Activity : No heavy lifting for 6 weeks (no lifting more than ~10-15 pounds). No tub baths, no pool or ocean for at least 2 weeks. Do not submerge the incisions in water. You may shower in 1-2 days and let water run over the incision, but do not submerge.    Dressing : Keep dressings dry for 2 days, you may sponge bath, remove outside dressing after 2 days, keep steri strips (or staples) in place.    Follow up : Follow up with Dr. Fitzgerald in the office in approximately 2 weeks. Call the office if you have any questions or concerns.    SEEK IMMEDIATE MEDICAL CARE IF YOU HAVE ANY OF THE FOLLOWING SYMPTOMS: fever >101F, persistent vomiting, yellowing of the skin or eyes, or altered mental status.

## 2024-09-29 NOTE — H&P ADULT - NSHPPHYSICALEXAM_GEN_ALL_CORE
VITALS:  T(F): 98.1 (09-28-24 @ 23:49), Max: 98.1 (09-28-24 @ 20:54)  HR: 64 (09-28-24 @ 23:49) (64 - 82)  BP: 129/84 (09-28-24 @ 23:49) (129/84 - 142/89)  RR: 18 (09-28-24 @ 23:49) (16 - 18)  SpO2: 99% (09-28-24 @ 23:49) (99% - 99%)    PHYSICAL EXAM:  General: NAD, AAOx3, calm and cooperative  HEENT: NCAT, CARIE, EOMI, Trachea ML, Neck supple  Cardiac: RRR S1, S2,   Respiratory: CTAB, normal respiratory effort,   Abdomen: Soft, non-distended, non-tender, no rebound, no guarding. Skin: Warm/dry, normal color, no jaundice

## 2024-09-29 NOTE — CHART NOTE - NSCHARTNOTEFT_GEN_A_CORE
PACU ANESTHESIA ADMISSION NOTE      Procedure: Laparoscopic cholecystectomy      Post op diagnosis:  Acute cholecystitis        ____  Intubated  TV:______       Rate: ______      FiO2: ______    __x__  Patent Airway    _x___  Full return of protective reflexes    _x___  Full recovery from anesthesia / back to baseline     Vitals:   T:  97.9F         R:     18             BP:     147/70             Sat:     99%              P: 79      Mental Status:  _x___ Awake   ___x__ Alert   _____ Drowsy   _____ Sedated    Nausea/Vomiting:  __x__ NO  ______Yes,   See Post - Op Orders          Pain Scale (0-10):  __0/10___    Treatment: ____ None    _x___ See Post - Op/PCA Orders    Post - Operative Fluids:   ____ Oral   __x__ See Post - Op Orders    Plan: Discharge:   __x__Home       _____Floor     _____Critical Care    _____  Other:_________________    Comments: Pt tolerated procedure well, no anesthesia related complications. Care of pt endorsed to PACU, report given to PACU RN. Discharge when criteria are met.

## 2024-09-29 NOTE — H&P ADULT - NSHPPOAPULMEMBOLUS_GEN_A_CORE
URETEROSCOPY LASER LITHOTRIPSY WITH STENT INSERTION  Progress Note    Kennedy Griffith  12/16/2022    Pre-op Diagnosis:   Renal stone [N20.0]       Post-Op Diagnosis Codes:     * Renal stone [N20.0]      Procedure(s):  CYSTOSCOPY,  RIGHT  URETEROSCOPY  RIGHT RETROGRADE PYELOGRAM < 1 HR  RIGHT URETERAL STENT PLACEMENT        Surgeon(s):  Tej Christine MD    Anesthesia: General    Staff:   Circulator: Kimmy Mendez RN  Laser Staff: Homa Gill RN  Radiology Technologist: Howie Cho  Scrub Person: Claus Burleson         Estimated Blood Loss: none    Urine Voided: * No values recorded between 12/16/2022  3:21 PM and 12/16/2022  4:34 PM *    Specimens:                None          Drains:   Ureteral Drain/Stent 6 Fr. (Active)       Findings:   1.  Normal urinary bladder without evidence of tumor stone or foreign body.  2.  Right ureteroscopy with narrowing of the distal ureter encountered.  Attempt at dilation with a 8/10 dilator, inability to pass ureteroscope beyond the area of narrowing.  Decision was made to place ureteral stent for passive dilation  3.  Right retrograde pyelogram with imaging filling the distal mid proximal ureter as well as renal collecting system.  4.  Successful placement of 4.8 x 26 cm double-J ureteral stent without string        Complications: None immediate          Tej Christine MD     Date: 12/16/2022  Time: 20:50 EST      
no

## 2024-09-29 NOTE — DISCHARGE NOTE PROVIDER - NSDCMRMEDTOKEN_GEN_ALL_CORE_FT
codeine-acetaminophen 30 mg-300 mg oral tablet: 1 tab(s) orally every 6 hours as needed for  severe pain MDD: 4 tabs  sertraline 25 mg oral tablet: 1 tab(s) orally once a day (at bedtime)

## 2024-10-01 LAB — SURGICAL PATHOLOGY STUDY: SIGNIFICANT CHANGE UP

## 2024-10-08 DIAGNOSIS — K66.0 PERITONEAL ADHESIONS (POSTPROCEDURAL) (POSTINFECTION): ICD-10-CM

## 2024-10-08 DIAGNOSIS — K21.9 GASTRO-ESOPHAGEAL REFLUX DISEASE WITHOUT ESOPHAGITIS: ICD-10-CM

## 2024-10-08 DIAGNOSIS — K76.0 FATTY (CHANGE OF) LIVER, NOT ELSEWHERE CLASSIFIED: ICD-10-CM

## 2024-10-08 DIAGNOSIS — K80.62 CALCULUS OF GALLBLADDER AND BILE DUCT WITH ACUTE CHOLECYSTITIS WITHOUT OBSTRUCTION: ICD-10-CM
